# Patient Record
Sex: FEMALE | Race: WHITE | NOT HISPANIC OR LATINO | Employment: UNEMPLOYED | URBAN - NONMETROPOLITAN AREA
[De-identification: names, ages, dates, MRNs, and addresses within clinical notes are randomized per-mention and may not be internally consistent; named-entity substitution may affect disease eponyms.]

---

## 2017-03-22 ENCOUNTER — OFFICE VISIT (OUTPATIENT)
Dept: URGENT CARE | Facility: PHYSICIAN GROUP | Age: 62
End: 2017-03-22
Payer: COMMERCIAL

## 2017-03-22 VITALS
HEIGHT: 64 IN | DIASTOLIC BLOOD PRESSURE: 84 MMHG | HEART RATE: 96 BPM | WEIGHT: 132 LBS | RESPIRATION RATE: 16 BRPM | OXYGEN SATURATION: 98 % | BODY MASS INDEX: 22.53 KG/M2 | SYSTOLIC BLOOD PRESSURE: 122 MMHG | TEMPERATURE: 97.4 F

## 2017-03-22 DIAGNOSIS — H61.21 IMPACTED CERUMEN OF RIGHT EAR: ICD-10-CM

## 2017-03-22 DIAGNOSIS — H92.01 OTALGIA OF RIGHT EAR: ICD-10-CM

## 2017-03-22 PROCEDURE — 99213 OFFICE O/P EST LOW 20 MIN: CPT | Performed by: NURSE PRACTITIONER

## 2017-03-22 ASSESSMENT — ENCOUNTER SYMPTOMS
CHILLS: 0
DIZZINESS: 0
COUGH: 0
SORE THROAT: 0
SWOLLEN GLANDS: 0
HEADACHES: 0
NECK PAIN: 0
SINUS PRESSURE: 1
HOARSE VOICE: 0
SHORTNESS OF BREATH: 0
DIAPHORESIS: 0
NAUSEA: 0

## 2017-03-22 ASSESSMENT — LIFESTYLE VARIABLES: SUBSTANCE_ABUSE: 0

## 2017-03-22 NOTE — PROGRESS NOTES
"Subjective:      Demetrice Giron is a 62 y.o. female who presents with Sinus Problem    Chief Complaint   Patient presents with   • Sinus Problem       Denies past medical, surgical or family history that is significant to today's problem.   RX or OTC medications reviewed with patient today.   Allergies   Allergen Reactions   • Other Environmental              Sinus Problem  This is a new problem. The current episode started 1 to 4 weeks ago. The problem has been gradually worsening since onset. There has been no fever. Her pain is at a severity of 3/10. The pain is mild. Associated symptoms include sinus pressure. Pertinent negatives include no chills, congestion, coughing, diaphoresis, ear pain, headaches, hoarse voice, neck pain, shortness of breath, sneezing, sore throat or swollen glands. (Itchy ears R>L Right ear pain/ pressure     ) Past treatments include saline sprays and oral decongestants (Qtips, antihistamines). The treatment provided no relief.       Review of Systems   Constitutional: Negative for chills and diaphoresis.   HENT: Positive for sinus pressure. Negative for congestion, ear pain, hoarse voice, sneezing and sore throat.    Respiratory: Negative for cough and shortness of breath.    Gastrointestinal: Negative for nausea.   Musculoskeletal: Negative for neck pain.   Neurological: Negative for dizziness and headaches.   Endo/Heme/Allergies: Negative for environmental allergies.   Psychiatric/Behavioral: Negative for substance abuse.          Objective:     /84 mmHg  Pulse 96  Temp(Src) 36.3 °C (97.4 °F)  Resp 16  Ht 1.626 m (5' 4\")  Wt 59.875 kg (132 lb)  BMI 22.65 kg/m2  SpO2 98%     Physical Exam   Constitutional: She is oriented to person, place, and time. She appears well-developed and well-nourished. No distress.   HENT:   Head: Normocephalic.   Right Ear: Hearing, external ear and ear canal normal.   Left Ear: Hearing, tympanic membrane, external ear and ear canal normal. "   Ears:    Nose: No mucosal edema, rhinorrhea or sinus tenderness. Right sinus exhibits no maxillary sinus tenderness and no frontal sinus tenderness. Left sinus exhibits no maxillary sinus tenderness and no frontal sinus tenderness.   Mouth/Throat: Uvula is midline. Oropharyngeal exudate present.   Eyes: Pupils are equal, round, and reactive to light. Right conjunctiva is injected. Left conjunctiva is injected.   Neck: Trachea normal, normal range of motion, full passive range of motion without pain and phonation normal. Neck supple.   Cardiovascular: Normal rate, regular rhythm, intact distal pulses and normal pulses.  PMI is not displaced.    Pulmonary/Chest: Effort normal and breath sounds normal.   Lymphadenopathy:     She has no cervical adenopathy.        Right: No supraclavicular adenopathy present.        Left: No supraclavicular adenopathy present.   Neurological: She is alert and oriented to person, place, and time. Gait normal.   Skin: Skin is warm and dry. She is not diaphoretic.   Psychiatric: She has a normal mood and affect. Her speech is normal and behavior is normal. Cognition and memory are normal.   Nursing note and vitals reviewed.    Cerumen removed easily with flushing after use of wax softener, currettage  Pt tolerated well. No adverse effects.   Reassessment revealed: normal canal  Reports immediate improvement in pain/ pressure and hearing            Assessment/Plan:     1. Impacted cerumen of right ear     2. Otalgia of right ear       FU prn  OTC age appropriate, analgesic of choice. such as acetaminophen (Ex brand name: Tylenol), ibuprofen (Ex brand names: Advil, Motrin), or naproxen   (Ex brand names: Aleve, Naprosyn).  Follow manufactures dosing and safety precautions.

## 2017-03-22 NOTE — MR AVS SNAPSHOT
"Demetrice Giron   3/22/2017 12:40 PM   Office Visit   MRN: [id number]    Department:  [facility]   Dept Phone:  [phone]    Description:  Female : 1955   Provider:  ANANT Moreno           Reason for Visit     Sinus Problem           Allergies as of 3/22/2017     Allergen Noted Reactions    Other Environmental 2015         You were diagnosed with     Impacted cerumen of right ear   [058567]       Otalgia of right ear   [230357]         Vital Signs     Blood Pressure Pulse Temperature Respirations Height Weight    122/84 mmHg 96 36.3 °C (97.4 °F) 16 1.626 m (5' 4\") 59.875 kg (132 lb)    Body Mass Index Oxygen Saturation Smoking Status             22.65 kg/m2 98% Never Smoker          Basic Information     Date Of Birth Sex Race Ethnicity Preferred Language    1955 Female White Non- English      Problem List              ICD-10-CM Priority Class Noted - Resolved    Benign neoplasm of colon D12.6   12/15/2014 - Present    Osteoporosis M81.0   2015 - Present    Vitamin D deficiency E55.9   2015 - Present      Health Maintenance        Date Due Completion Dates    IMM DTaP/Tdap/Td Vaccine (1 - Tdap) 1973 ---    IMM ZOSTER VACCINE 2014 ---    COLONOSCOPY 2015 3/17/2015, 2014, 12/15/2014, 12/15/2014, 2009    IMM INFLUENZA (1) 2016 ---    MAMMOGRAM 11/3/2016 11/3/2015, 2014, 2014 (Done)    Override on 2014: Done (gbi)            Current Immunizations     No immunizations on file.      Below and/or attached are the medications your provider expects you to take. Review all of your home medications and newly ordered medications with your provider and/or pharmacist. Follow medication instructions as directed by your provider and/or pharmacist. Please keep your medication list with you and share with your provider. Update the information when medications are discontinued, doses are changed, or new medications " (including over-the-counter products) are added; and carry medication information at all times in the event of emergency situations     Allergies:  OTHER ENVIRONMENTAL - (reactions not documented)               Medications  Valid as of: March 22, 2017 -  2:22 PM    Generic Name Brand Name Tablet Size Instructions for use    Alendronate Sodium (Tab) FOSAMAX 70 MG Take 1 Tab by mouth every 7 days.        Glucosamine-Chondroitin   Take  by mouth every day.        Multiple Vitamins-Minerals   Take  by mouth every day.        .                 Medicines prescribed today were sent to:     [facility] - [place], [place] - [address] [facility]    [address] [place] [address] [address] [facility] [place] [address]    Phone: [phone] Fax: [phone]    Open 24 Hours?: No      Medication refill instructions:       If your prescription bottle indicates you have medication refills left, it is not necessary to call your provider’s office. Please contact your pharmacy and they will refill your medication.    If your prescription bottle indicates you do not have any refills left, you may request refills at any time through one of the following ways: The online [email or web address] system (except Urgent Care), by calling your provider’s office, or by asking your pharmacy to contact your provider’s office with a refill request. Medication refills are processed only during regular business hours and may not be available until the next business day. Your provider may request additional information or to have a follow-up visit with you prior to refilling your medication.   *Please Note: Medication refills are assigned a new Rx number when refilled electronically. Your pharmacy may indicate that no refills were authorized even though a new prescription for the same medication is available at the pharmacy. Please request the medicine by name with the pharmacy before contacting your provider for a refill.           [email or web address] Access Code:  [email or web address]  Expires: 4/21/2017  2:22 PM    GlobalLab  A secure, online tool to manage your health information     "[employer]’s GlobalLab® is a secure, online tool that connects you to your personalized health information from the privacy of your home -- day or night - making it very easy for you to manage your healthcare. Once the activation process is completed, you can even access your medical information using the GlobalLab lisa, which is available for free in the Apple Lisa store or Google Play store.     GlobalLab provides the following levels of access (as shown below):   My Chart Features   [facility] Primary Care Doctor [facility]  Specialists [facility]  Urgent  Care Non-[facility]  Primary Care  Doctor   Email your healthcare team securely and privately 24/7 X X X    Manage appointments: schedule your next appointment; view details of past/upcoming appointments X      Request prescription refills. X      View recent personal medical records, including lab and immunizations X X X X   View health record, including health history, allergies, medications X X X X   Read reports about your outpatient visits, procedures, consult and ER notes X X X X   See your discharge summary, which is a recap of your hospital and/or ER visit that includes your diagnosis, lab results, and care plan. X X       How to register for GlobalLab:  1. Go to  [email or web address].  2. Click on the Sign Up Now box, which takes you to the New Member Sign Up page. You will need to provide the following information:  a. Enter your GlobalLab Access Code exactly as it appears at the top of this page. (You will not need to use this code after you’ve completed the sign-up process. If you do not sign up before the expiration date, you must request a new code.)   b. Enter your date of birth.   c. Enter your home email address.   d. Click Submit, and follow the next screen’s instructions.  3. Create a GlobalLab ID. This will be your GlobalLab login ID and cannot be  changed, so think of one that is secure and easy to remember.  4. Create a [employer] password. You can change your password at any time.  5. Enter your Password Reset Question and Answer. This can be used at a later time if you forget your password.   6. Enter your e-mail address. This allows you to receive e-mail notifications when new information is available in [employer].  7. Click Sign Up. You can now view your health information.    For assistance activating your [employer] account, call [phone]

## 2017-11-15 ENCOUNTER — OFFICE VISIT (OUTPATIENT)
Dept: MEDICAL GROUP | Facility: CLINIC | Age: 62
End: 2017-11-15
Payer: COMMERCIAL

## 2017-11-15 VITALS
OXYGEN SATURATION: 97 % | RESPIRATION RATE: 16 BRPM | HEIGHT: 64 IN | TEMPERATURE: 98.3 F | HEART RATE: 80 BPM | SYSTOLIC BLOOD PRESSURE: 122 MMHG | DIASTOLIC BLOOD PRESSURE: 74 MMHG | WEIGHT: 127.5 LBS | BODY MASS INDEX: 21.77 KG/M2

## 2017-11-15 DIAGNOSIS — M81.0 AGE-RELATED OSTEOPOROSIS WITHOUT CURRENT PATHOLOGICAL FRACTURE: ICD-10-CM

## 2017-11-15 DIAGNOSIS — Z12.31 VISIT FOR SCREENING MAMMOGRAM: ICD-10-CM

## 2017-11-15 DIAGNOSIS — Z13.6 SCREENING FOR CARDIOVASCULAR CONDITION: ICD-10-CM

## 2017-11-15 DIAGNOSIS — R22.1 PALPABLE MASS OF NECK: ICD-10-CM

## 2017-11-15 DIAGNOSIS — Z86.39 HISTORY OF HYPERLIPIDEMIA: ICD-10-CM

## 2017-11-15 DIAGNOSIS — Z23 NEED FOR VACCINATION: ICD-10-CM

## 2017-11-15 PROCEDURE — 90715 TDAP VACCINE 7 YRS/> IM: CPT | Performed by: PHYSICIAN ASSISTANT

## 2017-11-15 PROCEDURE — 90471 IMMUNIZATION ADMIN: CPT | Performed by: PHYSICIAN ASSISTANT

## 2017-11-15 PROCEDURE — 99214 OFFICE O/P EST MOD 30 MIN: CPT | Mod: 25 | Performed by: PHYSICIAN ASSISTANT

## 2017-11-15 ASSESSMENT — PATIENT HEALTH QUESTIONNAIRE - PHQ9: CLINICAL INTERPRETATION OF PHQ2 SCORE: 0

## 2017-11-15 NOTE — PROGRESS NOTES
Chief Complaint   Patient presents with   • Establish Care   • Orders Needed     mammo / bone density       HISTORY OF PRESENT ILLNESS: Patient is a 62 y.o. female established patient who presents today for evaluation and management of:    Palpable mass of neck  Patient states that she was seen by a chiropractor approximately one month ago when a lump was noticed in the very middle of the back of her neck. She states it is never bothered her and she has never tried anything to make this better. She does not notice if it is changing. It is not tender to touch.    Osteoporosis  Patient has a long history of osteoporosis. She had been taking alendronate 70 mg tablets for approximately 12 years but has not taken this medication for a year and a half now. She had been following up with annual DEXA screenings and it has been exactly 2 years since her last screening so she is requesting this again today. She has not fractured any bones since her last screening.    History of hyperlipidemia  In 2015, patient had elevated LDL in the 140s with normal HDL levels. She was never medicated for this and has had persistently elevated total cholesterol when she gets blood. She is requesting screening for high cholesterol today.       Patient Active Problem List    Diagnosis Date Noted   • Palpable mass of neck 11/15/2017   • History of hyperlipidemia 11/15/2017   • Osteoporosis 02/12/2015   • Vitamin D deficiency 02/12/2015   • Benign neoplasm of colon 12/15/2014       Allergies:Other environmental    Current Outpatient Prescriptions   Medication Sig Dispense Refill   • Multiple Vitamins-Minerals (MULTIVITAMIN PO) Take  by mouth every day.     • Glucosamine-Chondroitin (GLUCOSAMINE CHONDR COMPLEX PO) Take  by mouth every day.       No current facility-administered medications for this visit.        Social History   Substance Use Topics   • Smoking status: Passive Smoke Exposure - Never Smoker   • Smokeless tobacco: Never Used   •  "Alcohol use 1.2 oz/week     2 Glasses of wine per week       Family Status   Relation Status   • Mother Alive   • Father    •     • Sister Alive   • Brother Alive   • Maternal Grandmother    • Maternal Grandfather    • Sister Alive   • Brother Alive     Family History   Problem Relation Age of Onset   • Colon Cancer Mother    • Other Mother      CLL   • Bladder cancer Mother    • Lung Disease Father    • Hypertension     • Dementia Maternal Grandmother    • Cancer Maternal Grandmother      oral   • Stroke Maternal Grandfather      in his 80's  at 92   • Other Sister      colon polyps   • Other Brother      colon polyps       Review of Systems:   Constitutional: Negative for fever, chills, weight loss and malaise/fatigue.   HENT: Positive for ear pain, congestion, sore throat and neck pain that is getting better since recent acute illness.  Negative for nosebleeds.  Eyes: Negative for blurred vision.   Respiratory: Negative for cough, sputum production, shortness of breath and wheezing.    Cardiovascular: Negative for chest pain, palpitations, orthopnea and leg swelling.   Gastrointestinal: Negative for heartburn, nausea, vomiting and abdominal pain.   Genitourinary: Negative for dysuria, urgency and frequency.   Musculoskeletal: Positive for occasional back pain. Negative for myalgias, and joint pain.   Skin: Negative for rash and itching.   Neurological: Positive for headache with recent illness that is improving. Negative for dizziness, tingling, tremors, sensory change, focal weakness.   Endo/Heme/Allergies: Does not bruise/bleed easily.   Psychiatric/Behavioral: Negative for depression, suicidal ideas and memory loss.  The patient is not nervous/anxious and does not have insomnia.      Exam:  Blood pressure 122/74, pulse 80, temperature 36.8 °C (98.3 °F), resp. rate 16, height 1.626 m (5' 4\"), weight 57.8 kg (127 lb 8 oz), SpO2 97 %.  Body mass index is 21.89 kg/m².  General:  " Healthy-Appearing female in NAD  HEENT:Head is grossly normal. Tymp a later is probably administrativeanic membran  Neck: Supple without masses. Thyroid is not visibly enlarged.  Pulmonary: Clear to ausculation. Normal effort. No rales, ronchi, or wheezing.  Cardiovascular: Regular rate and rhythm without murmur. Carotid and radial pulses are intact and equal bilaterally.  Extremities: no clubbing, cyanosis, or edema.    Medical decision-making and discussion:  1. Visit for screening mammogram  - MA-MAMMO SCREENING BILAT W/MIKHAIL W/CAD; Future    2. Age-related osteoporosis without current pathological fracture  - DS-BONE DENSITY STUDY (DEXA); Future  - CMP (12)    3. Need for vaccination  - Tdap =>6yo IM    4. Screening for cardiovascular condition  - LIPID PANEL    5. Palpable mass of neck  Patient advised that if this mass begins to become hard, immobile, lumpy or changes in any way she should return for follow-up visit and potentially new ultrasound.  - US-SOFT TISSUES OF HEAD - NECK; Future    6. History of hyperlipidemia    - CMP (12)  - LIPID PANEL    Please note that this dictation was created using voice recognition software. I have made every reasonable attempt to correct obvious errors, but I expect that there are errors of grammar and possibly content that I did not discover before finalizing the note.      Return in about 6 months (around 5/15/2018) for cholesterol or as needed..

## 2017-11-15 NOTE — ASSESSMENT & PLAN NOTE
Patient states that she was seen by a chiropractor approximately one month ago when a lump was noticed in the very middle of the back of her neck. She states it is never bothered her and she has never tried anything to make this better. She does not notice if it is changing. It is not tender to touch.

## 2017-11-15 NOTE — ASSESSMENT & PLAN NOTE
Patient has a long history of osteoporosis. She had been taking alendronate 70 mg tablets for approximately 12 years but has not taken this medication for a year and a half now. She had been following up with annual DEXA screenings and it has been exactly 2 years since her last screening so she is requesting this again today. She has not fractured any bones since her last screening.

## 2017-11-20 ENCOUNTER — HOSPITAL ENCOUNTER (OUTPATIENT)
Facility: MEDICAL CENTER | Age: 62
End: 2017-11-20
Attending: PHYSICIAN ASSISTANT
Payer: COMMERCIAL

## 2017-11-20 ENCOUNTER — NON-PROVIDER VISIT (OUTPATIENT)
Dept: MEDICAL GROUP | Facility: CLINIC | Age: 62
End: 2017-11-20
Payer: COMMERCIAL

## 2017-11-20 DIAGNOSIS — Z01.89 ROUTINE LAB DRAW: ICD-10-CM

## 2017-11-20 LAB
ALBUMIN SERPL BCP-MCNC: 4.2 G/DL (ref 3.2–4.9)
ALBUMIN/GLOB SERPL: 1.6 G/DL
ALP SERPL-CCNC: 62 U/L (ref 30–99)
ALT SERPL-CCNC: 40 U/L (ref 2–50)
AMBIGUOUS DTTM AMBI4: NORMAL
ANION GAP SERPL CALC-SCNC: 5 MMOL/L (ref 0–11.9)
AST SERPL-CCNC: 21 U/L (ref 12–45)
BILIRUB SERPL-MCNC: 0.4 MG/DL (ref 0.1–1.5)
BUN SERPL-MCNC: 10 MG/DL (ref 8–22)
CALCIUM SERPL-MCNC: 9.7 MG/DL (ref 8.5–10.5)
CHLORIDE SERPL-SCNC: 106 MMOL/L (ref 96–112)
CHOLEST SERPL-MCNC: 210 MG/DL (ref 100–199)
CO2 SERPL-SCNC: 28 MMOL/L (ref 20–33)
CREAT SERPL-MCNC: 0.57 MG/DL (ref 0.5–1.4)
GFR SERPL CREATININE-BSD FRML MDRD: >60 ML/MIN/1.73 M 2
GLOBULIN SER CALC-MCNC: 2.6 G/DL (ref 1.9–3.5)
GLUCOSE SERPL-MCNC: 84 MG/DL (ref 65–99)
HDLC SERPL-MCNC: 53 MG/DL
LDLC SERPL CALC-MCNC: 133 MG/DL
POTASSIUM SERPL-SCNC: 4 MMOL/L (ref 3.6–5.5)
PROT SERPL-MCNC: 6.8 G/DL (ref 6–8.2)
SODIUM SERPL-SCNC: 139 MMOL/L (ref 135–145)
TRIGL SERPL-MCNC: 121 MG/DL (ref 0–149)

## 2017-11-20 PROCEDURE — 36415 COLL VENOUS BLD VENIPUNCTURE: CPT | Performed by: PHYSICIAN ASSISTANT

## 2017-11-20 PROCEDURE — 80053 COMPREHEN METABOLIC PANEL: CPT

## 2017-11-20 PROCEDURE — 80061 LIPID PANEL: CPT

## 2017-11-20 PROCEDURE — 99000 SPECIMEN HANDLING OFFICE-LAB: CPT | Performed by: PHYSICIAN ASSISTANT

## 2017-11-21 NOTE — PROGRESS NOTES
I reviewed labs. LDL or bad cholesterol remains elevated. I would like to have the patient return for follow up to discuss.

## 2017-12-19 ENCOUNTER — TELEPHONE (OUTPATIENT)
Dept: MEDICAL GROUP | Facility: CLINIC | Age: 62
End: 2017-12-19

## 2017-12-19 NOTE — TELEPHONE ENCOUNTER
----- Message from Radha Clements P.A.-C. sent at 12/19/2017  1:16 PM PST -----  Mammogram has been read as normal. Please recheck in one year.

## 2017-12-19 NOTE — TELEPHONE ENCOUNTER
----- Message from Radha Clements P.A.-C. sent at 12/19/2017  1:18 PM PST -----  I reviewed bone density screening. Although you still have osteoporosis, bone density has improved since prior screening.

## 2018-01-16 DIAGNOSIS — R22.1 PALPABLE MASS OF NECK: ICD-10-CM

## 2018-02-05 ENCOUNTER — APPOINTMENT (OUTPATIENT)
Dept: SCHEDULING | Facility: IMAGING CENTER | Age: 63
End: 2018-02-05
Payer: COMMERCIAL

## 2018-02-05 ENCOUNTER — TELEMEDICINE ORIGINATING SITE VISIT (OUTPATIENT)
Dept: MEDICAL GROUP | Facility: CLINIC | Age: 63
End: 2018-02-05
Payer: COMMERCIAL

## 2018-02-05 DIAGNOSIS — R22.1 PALPABLE MASS OF NECK: ICD-10-CM

## 2018-02-06 ENCOUNTER — OFFICE VISIT (OUTPATIENT)
Dept: MEDICAL GROUP | Facility: CLINIC | Age: 63
End: 2018-02-06
Payer: COMMERCIAL

## 2018-02-06 VITALS
OXYGEN SATURATION: 98 % | WEIGHT: 127 LBS | TEMPERATURE: 98.6 F | BODY MASS INDEX: 21.68 KG/M2 | DIASTOLIC BLOOD PRESSURE: 68 MMHG | RESPIRATION RATE: 16 BRPM | SYSTOLIC BLOOD PRESSURE: 122 MMHG | HEIGHT: 64 IN | HEART RATE: 82 BPM

## 2018-02-06 DIAGNOSIS — M81.0 AGE-RELATED OSTEOPOROSIS WITHOUT CURRENT PATHOLOGICAL FRACTURE: ICD-10-CM

## 2018-02-06 DIAGNOSIS — Z13.220 ENCOUNTER FOR LIPID SCREENING FOR CARDIOVASCULAR DISEASE: ICD-10-CM

## 2018-02-06 DIAGNOSIS — E78.2 MIXED HYPERLIPIDEMIA: ICD-10-CM

## 2018-02-06 DIAGNOSIS — Z13.6 ENCOUNTER FOR LIPID SCREENING FOR CARDIOVASCULAR DISEASE: ICD-10-CM

## 2018-02-06 PROCEDURE — 99212 OFFICE O/P EST SF 10 MIN: CPT | Performed by: PHYSICIAN ASSISTANT

## 2018-02-06 NOTE — PROGRESS NOTES
Chief Complaint   Patient presents with   • Hyperlipidemia       HISTORY OF PRESENT ILLNESS: Patient is a 63 y.o. female established patient who presents today for evaluation and management of:    Mixed hyperlipidemia  PAtient's LDL has been elevated for many years but never treated except with diet and exercise. Her last LDL on file was 145 and most recent on file is 133 as of Nov, 2017. The patient denies chest pain, blurry vision or shortness of breath at this time. She is hesitant to start taking a statin medication and would like to have a repeat lab test completed in the next few weeks before deciding if this medication is appropriate. She states she does not eat very many animal fats, cooks with olive oil and exercises on a regular basis.    Osteoporosis  Patient has a long history of osteoporosis. She had been taking alendronate 70 mg tablets for approximately 12 years but has not taken this medication for about two years now. She had been following up with annual DEXA screenings and on her most recent screening it seems as though her bone density has not declined since she stopped taking the fosamax.- she is still osteopenic with mild improvement. She has not fractured any bones since her last screening.       Patient Active Problem List    Diagnosis Date Noted   • Mixed hyperlipidemia 02/06/2018   • Palpable mass of neck 11/15/2017   • History of hyperlipidemia 11/15/2017   • Osteoporosis 02/12/2015   • Vitamin D deficiency 02/12/2015   • Benign neoplasm of colon 12/15/2014       Allergies:Other environmental    Current Outpatient Prescriptions   Medication Sig Dispense Refill   • Multiple Vitamins-Minerals (MULTIVITAMIN PO) Take  by mouth every day.     • Glucosamine-Chondroitin (GLUCOSAMINE CHONDR COMPLEX PO) Take  by mouth every day.       No current facility-administered medications for this visit.        Social History   Substance Use Topics   • Smoking status: Passive Smoke Exposure - Never Smoker     " Types: Cigarettes   • Smokeless tobacco: Never Used   • Alcohol use 1.2 oz/week     2 Glasses of wine per week       Family Status   Relation Status   • Mother Alive   • Father    •     • Sister Alive   • Brother Alive   • Maternal Grandmother    • Maternal Grandfather    • Sister Alive   • Brother Alive     Family History   Problem Relation Age of Onset   • Colon Cancer Mother    • Other Mother      CLL   • Bladder cancer Mother    • Lung Disease Father    • Hypertension     • Dementia Maternal Grandmother    • Cancer Maternal Grandmother      oral   • Stroke Maternal Grandfather      in his 80's  at 92   • Other Sister      colon polyps   • Other Brother      colon polyps       Review of Systems:   Constitutional: Negative for fever, chills, weight loss and malaise/fatigue.   HENT: Negative for ear pain, nosebleeds, congestion, sore throat and neck pain.    Eyes: Negative for blurred vision.   Respiratory: Negative for cough, sputum production, shortness of breath and wheezing.    Cardiovascular: Negative for chest pain, palpitations, orthopnea and leg swelling.   Gastrointestinal: Negative for heartburn, nausea, vomiting and abdominal pain.   Genitourinary: Negative for dysuria, urgency and frequency.   Musculoskeletal: Negative for myalgias, back pain and joint pain.   Skin: Negative for rash and itching.   Neurological: Negative for dizziness, tingling, tremors, sensory change, focal weakness and headaches.   Endo/Heme/Allergies: Does not bruise/bleed easily.   Psychiatric/Behavioral: Negative for depression, suicidal ideas and memory loss.  The patient is not nervous/anxious and does not have insomnia.      Exam:  Blood pressure 122/68, pulse 82, temperature 37 °C (98.6 °F), resp. rate 16, height 1.626 m (5' 4\"), weight 57.6 kg (127 lb), SpO2 98 %.  Body mass index is 21.8 kg/m².  General:  Healthy-Appearing female in NAD  Head: is grossly normal.  Neck: Supple without masses. " Thyroid is not visibly enlarged.  Pulmonary: Clear to ausculation. Normal effort. No rales, ronchi, or wheezing.  Cardiovascular: Regular rate and rhythm without murmur. Carotid and radial pulses are intact and equal bilaterally.  Extremities: no clubbing, cyanosis, or edema.    Medical decision-making and discussion:  1. Mixed hyperlipidemia  - LIPID PANEL    2. Age-related osteoporosis without current pathological fracture  DEXA scans reviewed with patient today. She was advised to complete repeat DEXA scans every 2 years and restart Fosamax as needed per her provider at that time.    3. Encounter for lipid screening for cardiovascular disease  - LIPID PANEL      Please note that this dictation was created using voice recognition software. I have made every reasonable attempt to correct obvious errors, but I expect that there are errors of grammar and possibly content that I did not discover before finalizing the note.      Return in about 6 weeks (around 3/20/2018) for follow up cholesterol.

## 2018-02-06 NOTE — ASSESSMENT & PLAN NOTE
PAtient's LDL has been elevated for many years but never treated except with diet and exercise. Her last LDL on file was 145 and most recent on file is 133 as of Nov, 2017. The patient denies chest pain, blurry vision or shortness of breath at this time. She is hesitant to start taking a statin medication and would like to have a repeat lab test completed in the next few weeks before deciding if this medication is appropriate. She states she does not eat very many animal fats, cooks with olive oil and exercises on a regular basis.

## 2022-11-03 ENCOUNTER — PATIENT MESSAGE (OUTPATIENT)
Dept: HEALTH INFORMATION MANAGEMENT | Facility: OTHER | Age: 67
End: 2022-11-03